# Patient Record
Sex: MALE | Race: BLACK OR AFRICAN AMERICAN | NOT HISPANIC OR LATINO | Employment: FULL TIME | URBAN - METROPOLITAN AREA
[De-identification: names, ages, dates, MRNs, and addresses within clinical notes are randomized per-mention and may not be internally consistent; named-entity substitution may affect disease eponyms.]

---

## 2017-08-28 ENCOUNTER — OFFICE VISIT (OUTPATIENT)
Dept: URGENT CARE | Facility: CLINIC | Age: 27
End: 2017-08-28

## 2017-08-28 VITALS
HEART RATE: 79 BPM | RESPIRATION RATE: 18 BRPM | DIASTOLIC BLOOD PRESSURE: 96 MMHG | SYSTOLIC BLOOD PRESSURE: 153 MMHG | OXYGEN SATURATION: 99 % | TEMPERATURE: 99 F | BODY MASS INDEX: 26.52 KG/M2 | HEIGHT: 66 IN | WEIGHT: 165 LBS

## 2017-08-28 DIAGNOSIS — I88.9 CERVICAL LYMPHADENITIS: Primary | ICD-10-CM

## 2017-08-28 PROCEDURE — 3008F BODY MASS INDEX DOCD: CPT | Mod: S$GLB,,, | Performed by: PHYSICIAN ASSISTANT

## 2017-08-28 PROCEDURE — 99203 OFFICE O/P NEW LOW 30 MIN: CPT | Mod: S$GLB,,, | Performed by: PHYSICIAN ASSISTANT

## 2017-08-28 RX ORDER — AMOXICILLIN AND CLAVULANATE POTASSIUM 875; 125 MG/1; MG/1
1 TABLET, FILM COATED ORAL EVERY 12 HOURS
Qty: 14 TABLET | Refills: 0 | Status: SHIPPED | OUTPATIENT
Start: 2017-08-28 | End: 2017-09-04

## 2017-08-28 NOTE — LETTER
August 28, 2017      [facility] [address], [address]  [place] [address]  Phone: [phone]  Fax: [phone]       Patient: iRshi Fiore   YOB: 1990  Date of Visit: 08/28/2017    To Whom It May Concern:    Bhavesh Fiore  was at [facility] on 08/28/2017. He may return to work/school on 08/29/2017 11:00pm with no restrictions. If you have any questions or concerns, or if I can be of further assistance, please do not hesitate to contact me.    Sincerely,    NIKHIL Michaels

## 2017-08-28 NOTE — PATIENT INSTRUCTIONS
Local Lymph Node Infection, Antibiotic Treatment    You have a bacterial infection of a lymph node. The lymph nodes are part of your immune system. They are found under the jaw and along the side of the neck, in the armpits, and in the groin. An infection or inflammation in nearby tissues causes the lymph nodes to swell and become tender.  When a bacterial infection occurs in the lymph node, it becomes very painful. The nearby skin gets red and warm. You may also have a fever.  Antibiotics and hot compresses are used to treat this infection. The pain and redness will get better over the next 7 to 10 days. Swelling may take several months to go away.  Sometimes an abscess (with pus) forms inside the lymph node. If this happens, antibiotics may not be enough to cure the infection. Minor surgery may be needed to drain the pus.  Home care  Follow these guidelines when caring for yourself at home:  · Take all of the antibiotic medicine exactly as prescribed until it is gone. Be careful not to miss any doses, especially during the first few days.  · Make a hot compress by running hot water over a face cloth. Apply it to the sore area until the cloth cools off. Repeat this for 20 minutes. Use the hot compress 3 times a day for the first 3 days, or until the pain and redness begin to get better. The heat will increase the blood flow to the area and speed the healing process.  · You may use acetaminophen or ibuprofen to control pain and fever, unless another medicine was prescribed for this. Dont use ibuprofen in children under 6 months of age. If you have chronic liver or kidney disease, talk with your healthcare provider before using these medicines. Also talk with your provider if youve had a stomach ulcer or GI bleeding. Dont give aspirin to anyone under 18 years of age who is ill with a fever. It may cause severe liver damage.  Follow-up care  Follow up with your healthcare provider, or as advised, after you finish  the antibiotics.  When to seek medical advice  Call your healthcare provider right away if any of these occur:  · Redness, swelling or pain in the lymph node gets worse  · The lymph node gets bigger, becomes soft in the middle, or doesnt seem to be getting better after youve taken the antibiotics for 2 days (48 hours)  · Pus or fluid drains from the lymph node  · You have difficulty breathing or swallowing  Also call your provider right away if you have a fever, or your childs provider if:  · Your child is younger than 12 weeks and has a fever of 100.4°F (38°C) or higher. Your baby may need to be seen by his or her healthcare provider.  · Your child is of any age and has repeated fevers above 104°F (40°C).  · Your child is younger than 2 years old and his or her fever continues for more than 24 hours. Or your child is 2 years old or older and his or her fever continues for more than 3 days.  Date Last Reviewed: 2/17/2015  © 3227-0133 [employer]. [address] [place] [address], [place] [address]. All rights reserved. This information is not intended as a substitute for professional medical care. Always follow your healthcare professional's instructions.

## 2017-08-28 NOTE — PROGRESS NOTES
"Subjective:       Patient ID: Rishi Fiore is a 27 y.o. male.    Vitals:  height is 5' 6" (1.676 m) and weight is 74.8 kg (165 lb). His temperature is 98.7 °F (37.1 °C). His blood pressure is 153/96 (abnormal) and his pulse is 79. His respiration is 18 and oxygen saturation is 99%.     Chief Complaint: URI    URI    This is a new problem. The current episode started in the past 7 days. The problem has been gradually worsening. There has been no fever. Associated symptoms include sinus pain and swollen glands. Pertinent negatives include no abdominal pain, chest pain, congestion, coughing, diarrhea, ear pain, headaches, nausea, sore throat, vomiting or wheezing. He has tried nothing for the symptoms. The treatment provided no relief.     Review of Systems   Constitution: Negative for chills, fever and malaise/fatigue.   HENT: Negative for congestion, ear pain, headaches, hoarse voice and sore throat.    Eyes: Negative for discharge and redness.   Cardiovascular: Negative for chest pain, dyspnea on exertion and leg swelling.   Respiratory: Negative for cough, shortness of breath, sputum production and wheezing.    Musculoskeletal: Negative for myalgias.   Gastrointestinal: Negative for abdominal pain, diarrhea, dysphagia, nausea and vomiting.       Objective:      Physical Exam   Constitutional: He is oriented to person, place, and time. He appears well-developed and well-nourished. No distress.   HENT:   Head: Normocephalic and atraumatic.   Right Ear: External ear normal.   Left Ear: External ear normal.   Nose: Nose normal.   Mouth/Throat: Oropharynx is clear and moist. No oropharyngeal exudate.   Eyes: EOM are normal. Pupils are equal, round, and reactive to light.   Neck: Normal range of motion. Neck supple. No thyromegaly present.   Cardiovascular: Normal rate, regular rhythm, normal heart sounds and intact distal pulses.  Exam reveals no gallop and no friction rub.    No murmur heard.  Pulmonary/Chest: " Effort normal and breath sounds normal. No stridor. No respiratory distress. He has no wheezes. He has no rales.   Abdominal: Soft. Bowel sounds are normal.   Musculoskeletal: Normal range of motion. He exhibits no edema, tenderness or deformity.   Lymphadenopathy:     He has cervical adenopathy (left sided cervical lymphadenopathy with moderate tenderness, no increased warmth or erythema, mild enlargement on the right as well but nontender).   Neurological: He is alert and oriented to person, place, and time.   Skin: Skin is warm and dry. No rash noted. He is not diaphoretic. No erythema.   Psychiatric: He has a normal mood and affect. His behavior is normal.       Discussed plan with patient. Discussed follow up within 1 week with his PCP for further evaluation if no improvement. Discussed warning signs of worsening symptoms including shortness of breath, difficulty swallowing and need to go to the ER for these worsening symptoms. Pt understands.  Assessment:       1. Cervical lymphadenitis        Plan:         Cervical lymphadenitis  -     amoxicillin-clavulanate 875-125mg (AUGMENTIN) 875-125 mg per tablet; Take 1 tablet by mouth every 12 (twelve) hours.  Dispense: 14 tablet; Refill: 0

## 2020-06-22 ENCOUNTER — NURSE TRIAGE (OUTPATIENT)
Dept: ADMINISTRATIVE | Facility: CLINIC | Age: 30
End: 2020-06-22

## 2020-06-23 ENCOUNTER — LAB VISIT (OUTPATIENT)
Dept: PRIMARY CARE CLINIC | Facility: OTHER | Age: 30
End: 2020-06-23
Payer: OTHER GOVERNMENT

## 2020-06-23 DIAGNOSIS — Z03.818 ENCOUNTER FOR OBSERVATION FOR SUSPECTED EXPOSURE TO OTHER BIOLOGICAL AGENTS RULED OUT: ICD-10-CM

## 2020-06-23 PROCEDURE — U0003 INFECTIOUS AGENT DETECTION BY NUCLEIC ACID (DNA OR RNA); SEVERE ACUTE RESPIRATORY SYNDROME CORONAVIRUS 2 (SARS-COV-2) (CORONAVIRUS DISEASE [COVID-19]), AMPLIFIED PROBE TECHNIQUE, MAKING USE OF HIGH THROUGHPUT TECHNOLOGIES AS DESCRIBED BY CMS-2020-01-R: HCPCS

## 2020-06-26 LAB — SARS-COV-2 RNA RESP QL NAA+PROBE: NOT DETECTED

## 2020-07-24 ENCOUNTER — LAB VISIT (OUTPATIENT)
Dept: PRIMARY CARE CLINIC | Facility: OTHER | Age: 30
End: 2020-07-24
Attending: INTERNAL MEDICINE
Payer: OTHER GOVERNMENT

## 2020-07-24 DIAGNOSIS — Z03.818 ENCOUNTER FOR OBSERVATION FOR SUSPECTED EXPOSURE TO OTHER BIOLOGICAL AGENTS RULED OUT: ICD-10-CM

## 2020-07-24 PROCEDURE — U0003 INFECTIOUS AGENT DETECTION BY NUCLEIC ACID (DNA OR RNA); SEVERE ACUTE RESPIRATORY SYNDROME CORONAVIRUS 2 (SARS-COV-2) (CORONAVIRUS DISEASE [COVID-19]), AMPLIFIED PROBE TECHNIQUE, MAKING USE OF HIGH THROUGHPUT TECHNOLOGIES AS DESCRIBED BY CMS-2020-01-R: HCPCS

## 2020-07-27 LAB — SARS-COV-2 RNA RESP QL NAA+PROBE: NEGATIVE

## 2021-04-29 ENCOUNTER — PATIENT MESSAGE (OUTPATIENT)
Dept: RESEARCH | Facility: HOSPITAL | Age: 31
End: 2021-04-29

## 2021-08-21 ENCOUNTER — HOSPITAL ENCOUNTER (EMERGENCY)
Facility: HOSPITAL | Age: 31
Discharge: HOME | End: 2021-08-21
Attending: EMERGENCY MEDICINE

## 2021-08-21 VITALS
BODY MASS INDEX: 24.11 KG/M2 | HEIGHT: 66 IN | TEMPERATURE: 99 F | RESPIRATION RATE: 18 BRPM | SYSTOLIC BLOOD PRESSURE: 143 MMHG | DIASTOLIC BLOOD PRESSURE: 84 MMHG | HEART RATE: 88 BPM | OXYGEN SATURATION: 100 % | WEIGHT: 150 LBS

## 2021-08-21 DIAGNOSIS — L02.01 FACIAL ABSCESS: Primary | ICD-10-CM

## 2021-08-21 PROCEDURE — 10160 PNXR ASPIR ABSC HMTMA BULLA: CPT

## 2021-08-21 PROCEDURE — 99282 EMERGENCY DEPT VISIT SF MDM: CPT | Mod: 25

## 2021-08-21 RX ORDER — SULFAMETHOXAZOLE AND TRIMETHOPRIM 800; 160 MG/1; MG/1
1 TABLET ORAL 2 TIMES DAILY
Qty: 10 TABLET | Refills: 0 | Status: SHIPPED | OUTPATIENT
Start: 2021-08-21 | End: 2021-08-26

## 2021-08-21 RX ORDER — LIDOCAINE HYDROCHLORIDE 10 MG/ML
5 INJECTION, SOLUTION EPIDURAL; INFILTRATION; INTRACAUDAL; PERINEURAL
Status: DISCONTINUED | OUTPATIENT
Start: 2021-08-21 | End: 2021-08-21 | Stop reason: HOSPADM